# Patient Record
(demographics unavailable — no encounter records)

---

## 2024-11-26 NOTE — HISTORY OF PRESENT ILLNESS
[Mother] : mother [Toothpaste] : Primary Fluoride Source: Toothpaste [Up to date] : Up to date [Normal] : normal [Days of Bleeding: _____] : Days of bleeding: [unfilled] [Menstrual products used per day: _____] : Menstrual products used per day: [unfilled] [Age of Menarche: ____] : Age of Menarche: [unfilled] [Mother's age at onset of menses: ____] : Mother's age at onset of menses: [unfilled] [Eats meals with family] : eats meals with family [Has family members/adults to turn to for help] : has family members/adults to turn to for help [Is permitted and is able to make independent decisions] : Is permitted and is able to make independent decisions [Grade: ____] : Grade: [unfilled] [Normal Performance] : normal performance [Normal Behavior/Attention] : normal behavior/attention [Normal Homework] : normal homework [Eats regular meals including adequate fruits and vegetables] : eats regular meals including adequate fruits and vegetables [Drinks non-sweetened liquids] : drinks non-sweetened liquids  [Calcium source] : calcium source [Has friends] : has friends [At least 1 hour of physical activity a day] : at least 1 hour of physical activity a day [Has interests/participates in community activities/volunteers] : has interests/participates in community activities/volunteers. [Uses safety belts/safety equipment] : uses safety belts/safety equipment  [No] : Patient has not had sexual intercourse. [Has ways to cope with stress] : has ways to cope with stress [Displays self-confidence] : displays self-confidence [With Teen] : teen [With Parent/Guardian] : parent/guardian [NO] : No [Irregular menses] : no irregular menses [Heavy Bleeding] : no heavy bleeding [Painful Cramps] : no painful cramps [Hirsutism] : no hirsutism [Acne] : no acne [Tampon Use] : no tampon use [Sleep Concerns] : no sleep concerns [Has concerns about body or appearance] : does not have concerns about body or appearance [Screen time (except homework) less than 2 hours a day] : no screen time (except homework) less than 2 hours a day [Uses electronic nicotine delivery system] : does not use electronic nicotine delivery system [Exposure to electronic nicotine delivery system] : no exposure to electronic nicotine delivery system [Uses tobacco] : does not use tobacco [Exposure to tobacco] : no exposure to tobacco [Uses drugs] : does not use drugs  [Exposure to drugs] : no exposure to drugs [Drinks alcohol] : does not drink alcohol [Exposure to alcohol] : no exposure to alcohol [Impaired/distracted driving] : no impaired/distracted driving [Has peer relationships free of violence] : does not have peer relationships free of violence [Has problems with sleep] : does not have problems with sleep [Gets depressed, anxious, or irritable/has mood swings] : does not get depressed, anxious, or irritable/has mood swings [Has thought about hurting self or considered suicide] : has not thought about hurting self or considered suicide [de-identified] : No acute concerns [de-identified] : Went to dentist over a year ago [FreeTextEntry8] : Sometimes painful cramps but manageable and no concerns [de-identified] : Lives with parents.  [de-identified] : On basketball team [de-identified] : Feels safe around friends and family [FreeTextEntry1] : SDOH Screening Questionnaire   SDOH (Social Determinants of Health) Questionnaire: 1. Housing: Do you worry that in the upcoming months, your family, or child, may not have a safe or stable place to live?  no 2. Food security: Within the last 12 months, did the food you bought not last, and you did not have money to buy more? no 3. Community: Do you need help getting public benefits like food stamps or WIC? no 4. Transportation: Does your child have chronic medical condition and therefore struggle with transportation to attend medical appointments? no 5. Healthcare Access: Do you need help getting health or dental insurance? no       Result: Negative Screen. No further intervention needed.

## 2024-11-26 NOTE — REVIEW OF SYSTEMS
[Nasal Discharge] : nasal discharge [Nasal Congestion] : nasal congestion [Sore Throat] : sore throat [Cough] : cough [Congestion] : congestion [Negative] : Genitourinary [Fever] : no fever [Chills] : no chills [Malaise] : no malaise [Difficulty with Sleep] : no difficulty with sleep [Change in Weight] : no change in weight [Night Sweats] : no night sweats [Headache] : no headache [Eye Discharge] : no eye discharge [Eye Redness] : no eye redness [Itchy Eyes] : no itchy eyes [Changes in Vision] : no changes in vision [Ear Pain] : no ear pain [Snoring] : no snoring [Sinus Pressure] : no sinus pressure [Wheezing] : no wheezing [FreeTextEntry2] : Orthostatic dizziness

## 2024-11-26 NOTE — DISCUSSION/SUMMARY
[FreeTextEntry1] : Summary: 16F with no significant PMH presents for HCM. Pt states she is UTD on vaccine (office does not have vaccine records). She feels safe at home and has a good support system of family and friends. She has good coping mechanisms for stress. She is not in a relationship and denies any substance use or sexual activity.  Patient c/o mild nasal congestion, mild cough, and sore throat.  Pt denies fevers, She states she still sometimes gets dizzy when getting up from lying position, however the dizziness has improved. She had 1 episode of fainting last year when getting bloodwork done and one episode of chest pain when exercising associated with hunger and dehydration. No other cardiac complaints and no FH of sudden death at a young age. Pt most likely has a mild viral URI, and mild orthostatic hypotension related to dehydration.  Plan: -encourage oral hydration -Flu vaccine offered and declined -supportive care for possible viral URI

## 2024-11-26 NOTE — PHYSICAL EXAM
[Alert] : alert [No Acute Distress] : no acute distress [Normocephalic] : normocephalic [Atraumatic] : atraumatic [EOMI Bilateral] : EOMI bilateral [Clear tympanic membranes with bony landmarks and light reflex present bilaterally] : clear tympanic membranes with bony landmarks and light reflex present bilaterally  [Pink Nasal Mucosa] : pink nasal mucosa [Nonerythematous Oropharynx] : nonerythematous oropharynx [Supple, full passive range of motion] : supple, full passive range of motion [No Palpable Masses] : no palpable masses [Clear to Auscultation Bilaterally] : clear to auscultation bilaterally [Regular Rate and Rhythm] : regular rate and rhythm [Normal S1, S2 audible] : normal S1, S2 audible [No Murmurs] : no murmurs [+2 Femoral Pulses] : +2 femoral pulses [Soft] : soft [NonTender] : non tender [Non Distended] : non distended [Normoactive Bowel Sounds] : normoactive bowel sounds [No Hepatomegaly] : no hepatomegaly [No Splenomegaly] : no splenomegaly [No Abnormal Lymph Nodes Palpated] : no abnormal lymph nodes palpated [Normal Muscle Tone] : normal muscle tone [No Gait Asymmetry] : no gait asymmetry [No pain or deformities with palpation of bone, muscles, joints] : no pain or deformities with palpation of bone, muscles, joints [Straight] : straight [+2 Patella DTR] : +2 patella DTR [Cranial Nerves Grossly Intact] : cranial nerves grossly intact [No Rash or Lesions] : no rash or lesions

## 2024-11-26 NOTE — RISK ASSESSMENT
[Have you ever had exercise-related chest pain or shortness of breath?] : Have you ever had exercise-related chest pain or shortness of breath? Yes [0] : 2) Feeling down, depressed, or hopeless: Not at all (0) [PHQ-2 Negative - No further assessment needed] : PHQ-2 Negative - No further assessment needed [[id number]] : 0 [Have you ever fainted, passed out or had an unexplained seizure suddenly and without warning, especially during exercise or in response] : Have you ever fainted, passed out or had an unexplained seizure suddenly and without warning, especially during exercise or in response to sudden loud noises such as doorbells, alarm clocks and ringing telephones? No [Has anyone in your immediate family (parents, grandparents, siblings) or other more distant relatives (aunts, uncles, cousins)  of heart] : Has anyone in your immediate family (parents, grandparents, siblings) or other more distant relatives (aunts, uncles, cousins)  of heart problems or had an unexpected sudden death before age 50 (This would include unexpected drownings, unexplained car accidents in which the relative was driving or sudden infant death syndrome.)? No